# Patient Record
Sex: FEMALE | Race: ASIAN | NOT HISPANIC OR LATINO | URBAN - METROPOLITAN AREA
[De-identification: names, ages, dates, MRNs, and addresses within clinical notes are randomized per-mention and may not be internally consistent; named-entity substitution may affect disease eponyms.]

---

## 2023-09-15 ENCOUNTER — APPOINTMENT (RX ONLY)
Dept: URBAN - METROPOLITAN AREA CLINIC 164 | Facility: CLINIC | Age: 23
Setting detail: DERMATOLOGY
End: 2023-09-15

## 2023-09-15 DIAGNOSIS — L82.1 OTHER SEBORRHEIC KERATOSIS: ICD-10-CM

## 2023-09-15 DIAGNOSIS — L81.4 OTHER MELANIN HYPERPIGMENTATION: ICD-10-CM | Status: INADEQUATELY CONTROLLED

## 2023-09-15 PROCEDURE — 99204 OFFICE O/P NEW MOD 45 MIN: CPT

## 2023-09-15 PROCEDURE — ? BENIGN DESTRUCTION COSMETIC

## 2023-09-15 PROCEDURE — ? COUNSELING

## 2023-09-15 PROCEDURE — ? PRESCRIPTION

## 2023-09-15 RX ORDER — HYDROQUINONE 4 %
CREAM (GRAM) TOPICAL
Qty: 30 | Refills: 2 | Status: ACTIVE

## 2023-09-15 ASSESSMENT — LOCATION DETAILED DESCRIPTION DERM
LOCATION DETAILED: LEFT SUPERIOR LATERAL MALAR CHEEK
LOCATION DETAILED: LEFT SUPERIOR CENTRAL MALAR CHEEK
LOCATION DETAILED: RIGHT SUPERIOR CENTRAL MALAR CHEEK
LOCATION DETAILED: LEFT INFERIOR CENTRAL MALAR CHEEK

## 2023-09-15 ASSESSMENT — LOCATION SIMPLE DESCRIPTION DERM
LOCATION SIMPLE: LEFT CHEEK
LOCATION SIMPLE: RIGHT CHEEK

## 2023-09-15 ASSESSMENT — LOCATION ZONE DERM: LOCATION ZONE: FACE

## 2023-09-15 NOTE — PROCEDURE: BENIGN DESTRUCTION COSMETIC
Post-Care Instructions: I reviewed with the patient in detail post-care instructions. Patient is to wear sunprotection, and avoid picking at any of the treated lesions. Pt may apply Vaseline to crusted or scabbing areas.
Price (Use Numbers Only, No Special Characters Or $): 125
Detail Level: Detailed
Anesthesia Type: 2% lidocaine with epinephrine
Consent: The patient's consent was obtained including but not limited to risks of crusting, scabbing, blistering, scarring, darker or lighter pigmentary change, recurrence, incomplete removal and infection.
Anesthesia Volume In Cc: 1

## 2024-01-26 ENCOUNTER — APPOINTMENT (RX ONLY)
Dept: URBAN - METROPOLITAN AREA CLINIC 164 | Facility: CLINIC | Age: 24
Setting detail: DERMATOLOGY
End: 2024-01-26

## 2024-01-26 DIAGNOSIS — L82.1 OTHER SEBORRHEIC KERATOSIS: ICD-10-CM

## 2024-01-26 DIAGNOSIS — L91.8 OTHER HYPERTROPHIC DISORDERS OF THE SKIN: ICD-10-CM

## 2024-01-26 DIAGNOSIS — Z41.9 ENCOUNTER FOR PROCEDURE FOR PURPOSES OTHER THAN REMEDYING HEALTH STATE, UNSPECIFIED: ICD-10-CM

## 2024-01-26 PROCEDURE — ? ADDITIONAL NOTES

## 2024-01-26 PROCEDURE — ? COSMETIC CONSULTATION: CHEMICAL PEELS

## 2024-01-26 PROCEDURE — ? COSMETIC CONSULTATION: HYDRAFACIAL

## 2024-01-26 PROCEDURE — ? COSMETIC QUOTE

## 2024-01-26 PROCEDURE — ? PRODUCT LINE (ZO SKIN HEALTH)

## 2024-01-26 PROCEDURE — ? COUNSELING

## 2024-01-26 PROCEDURE — ? BENIGN DESTRUCTION COSMETIC

## 2024-01-26 ASSESSMENT — LOCATION DETAILED DESCRIPTION DERM
LOCATION DETAILED: RIGHT AXILLARY VAULT
LOCATION DETAILED: LEFT AXILLARY VAULT
LOCATION DETAILED: RIGHT SUPERIOR MEDIAL MALAR CHEEK
LOCATION DETAILED: RIGHT SUPERIOR CENTRAL MALAR CHEEK

## 2024-01-26 ASSESSMENT — LOCATION SIMPLE DESCRIPTION DERM
LOCATION SIMPLE: LEFT AXILLARY VAULT
LOCATION SIMPLE: RIGHT CHEEK
LOCATION SIMPLE: RIGHT AXILLARY VAULT

## 2024-01-26 ASSESSMENT — LOCATION ZONE DERM
LOCATION ZONE: AXILLAE
LOCATION ZONE: FACE

## 2024-01-26 NOTE — PROCEDURE: COSMETIC QUOTE
Face Procedure 10 Units: 1
Breast Procedure 3 Percentage Discount: 0
Laser 14 Free Text Discount (In Dollars- Use Only Numbers And Decimals): 0.00
Face Procedure 4: VI Peel (Advanced)
Body Procedure 4: SkinPen
Face Procedure 1: Dermaplane add on
Body Procedure 1: VI Peel Body (small area)
Face Procedure 8 Price/Unit (In Dollars- Use Only Numbers And Decimals): 199
Misc Procedure 1 Price/Unit (In Dollars- Use Only Numbers And Decimals): 499
Face Procedure 5 Price/Unit (In Dollars- Use Only Numbers And Decimals): 389
Include Sales Tax On Facility Fees: No
Face Procedure 5 Percentage Discount: 15
Face Procedure 2 Units: 3
Body Procedure 3 Price/Unit (In Dollars- Use Only Numbers And Decimals): 299
Face Procedure 3 Price/Unit (In Dollars- Use Only Numbers And Decimals): 265
Face Procedure 10: HydraFacial (Platinum)
Face Procedure 7: Cosmetic Extractions (Milia)
Face Procedure 7 Price/Unit (In Dollars- Use Only Numbers And Decimals): 75
Body Procedure 4 Price/Unit (In Dollars- Use Only Numbers And Decimals): 329
Face Procedure 4 Price/Unit (In Dollars- Use Only Numbers And Decimals): 369
Face Procedure 1 Price/Unit (In Dollars- Use Only Numbers And Decimals): 50
Face Procedure 8: HydraFacial (Signature)
Include Sales Tax On Surgeon's Fees: Yes
Misc Procedure 1: HydraFacial Keravive
Face Procedure 5: Vi Peel Purify w/ Precision Plus
Body Procedure 2: VI Peel Body (large area)
Face Procedure 2 Price/Unit (In Dollars- Use Only Numbers And Decimals): 293.33
Body Procedure 2 Price/Unit (In Dollars- Use Only Numbers And Decimals): 450
Face Procedure 9: VI Peel Purify
Face Procedure 6: VI Peel Precision Plus
Face Procedure 3: HydraFacial (Deluxe)
Body Procedure 3: HydraFacial (Back)
Detail Level: Zone

## 2024-01-26 NOTE — PROCEDURE: PRODUCT LINE ([facility])
Product 41 Units: 0
Product 1 Price (In Dollars - Numeric Only, No Special Characters Or $): 47
Product 44 Price (In Dollars - Numeric Only, No Special Characters Or $): 0.00
Name Of Product 4: Renewal Creme
Product 11 Price (In Dollars - Numeric Only, No Special Characters Or $): 64
Product 16 Price (In Dollars - Numeric Only, No Special Characters Or $): 193
Product 24 Price (In Dollars - Numeric Only, No Special Characters Or $): 145
Product 13 Application Directions: Apply AM & PM before moisturizer.
Product 21 Application Directions: Apply 2x a week at night for week 0-2.\\nApply 3x a week at night for week 2-4.\\nApply 4x a week at night for week 4-6.
Product 29 Application Directions: Cleanse am & pm daily
Name Of Product 27: Exfoliation Accelerator
Name Of Product 19: Exfoliating Polish (travel)
Detail Level: Zone
Product 8 Application Directions: Apply AM & PM 7x a week.
Product 4 Price (In Dollars - Numeric Only, No Special Characters Or $): 115
Name Of Product 30: BrightAlive
Name Of Product 14: Dual Action Scrub
Name Of Product 9: Daily Power Defense
Name Of Product 22: Retinol Skin Brightener 0.1 Retinol
Product 27 Price (In Dollars - Numeric Only, No Special Characters Or $): 72
Product 19 Price (In Dollars - Numeric Only, No Special Characters Or $): 21
Product 1 Application Directions: Cleanse AM & PM 7x a week.
Product 24 Application Directions: Apply AM & PM after cleansing & toner.
Product 16 Application Directions: Written directions were handed to patient.
Name Of Product 2: Exfoliating Polish
Assigning Risk Information: Per AMA, level of risk is based upon consequences of the problem(s) addressed at the encounter when appropriately treated. Risk also includes medical decision making related to the need to initiate or forego further testing, treatment and/or hospitalization. Over the counter medication are assigned a risk level of low. Prescription medication management is assigned a risk level of moderate.
Product 30 Price (In Dollars - Numeric Only, No Special Characters Or $): 130
Name Of Product 12: Acne Control
Product 9 Price (In Dollars - Numeric Only, No Special Characters Or $): 160
Risk Of Complication Category: No MDM
Product 6 Application Directions: Use AM & PM 7x a week
Product 14 Price (In Dollars - Numeric Only, No Special Characters Or $): 80
Product 27 Application Directions: Apply AM, 7x after cleansing.
Product 19 Application Directions: Exfoliate 2-3x a week.
Name Of Product 17: Sunscreen + Primer SPF 30
Name Of Product 25: Brightening Eye Creme
Product 2 Price (In Dollars - Numeric Only, No Special Characters Or $): 68
Name Of Product 7: Recovery Creme
Name Of Product 5: Complexion Renewal Pads
Product 12 Price (In Dollars - Numeric Only, No Special Characters Or $): 36
Name Of Product 20: Instant Pore Refiner
Product 17 Price (In Dollars - Numeric Only, No Special Characters Or $): 67
Allow Plan To Count Towards E/M Coding: Yes
Product 14 Application Directions: Scrub in the PM 3-5x a week.
Product 22 Application Directions: Written instructions were handed to the patient.
Product 30 Application Directions: Apply AM & PM after Daily Power Defense
Name Of Product 28: Illuminating AOX Serum
Product 7 Price (In Dollars - Numeric Only, No Special Characters Or $): 125
Product 5 Price (In Dollars - Numeric Only, No Special Characters Or $): 53
Name Of Product 10: Exfoliating Cleanser
Name Of Product 23: Grown Factor Eye Serum
Product 28 Price (In Dollars - Numeric Only, No Special Characters Or $): 165
Product 20 Price (In Dollars - Numeric Only, No Special Characters Or $): 62
Product 2 Application Directions: Use AM or PM 2-3x a week.
Product 12 Application Directions: Cleanse & Apply to cystic flare up 1-3x a day.
Product 17 Application Directions: Apply AM & reapply if prolong in the sun, every 2 hours.
Name Of Product 15: Wrinkle + Texture Repair
Product 25 Application Directions: Apply AM & PM on the lower lids only, after cleansing.
Name Of Product 3: Calming Toner
Product 5 Application Directions: Apply after cleansing in the AM & PM 7x a week.
Product 10 Price (In Dollars - Numeric Only, No Special Characters Or $): 49
Name Of Product 13: Rozatrol
Product 15 Price (In Dollars - Numeric Only, No Special Characters Or $): 159
Product 28 Application Directions: Apply AM before SPF.
Product 7 Application Directions: Apply in the PM, include the neck. Can be applied in the AM if dry.
Name Of Product 18: ZO Skin Brightener 0.5 Retinol
Name Of Product 26: Hydrating Creme
Product 3 Price (In Dollars - Numeric Only, No Special Characters Or $): 40
Product 10 Units: 1
Name Of Product 6: Hydrating Cleanser
Name Of Product 21: Retinol Skin Brightener 0.25
Product 26 Price (In Dollars - Numeric Only, No Special Characters Or $): 120
Name Of Product 8: Growth Factor Serum
Product 23 Application Directions: Apply to upper & lower lids after cleansing.
Product 15 Application Directions: Apply 2x a week at night (0-2 weeks)\\nApply 3x a week at night (2-4 weeks)\\nApply 4x a week at night (4-6 weeks)
Name Of Product 29: Balancing Emulsion Cleanser
Product 13 Price (In Dollars - Numeric Only, No Special Characters Or $): 91
Product 21 Price (In Dollars - Numeric Only, No Special Characters Or $): 100
Product 29 Price (In Dollars - Numeric Only, No Special Characters Or $): 52
Product 3 Application Directions: Apply AM & PM 7x a week after cleansing or exfoliating (on days you exfoliate).
Name Of Product 11: Oil Control Pads
Product 8 Price (In Dollars - Numeric Only, No Special Characters Or $): 168
Name Of Product 1: Gentle Cleanser
Name Of Product 24: ZO BrightAlive Skin Brightener
Name Of Product 16: Skin Brightening Program
Product 26 Application Directions: Apply after cleaning and toning

## 2024-01-26 NOTE — PROCEDURE: COSMETIC CONSULTATION: CHEMICAL PEELS
Send Procedure Quote As Charge: No
Procedure Quote $ (Will Render In Note. Use Numbers Only, No Text Please.): 389
Detail Level: Detailed

## 2024-01-26 NOTE — PROCEDURE: ADDITIONAL NOTES
Detail Level: Zone
Additional Notes: Patient came in for a cosmetic consult. Patients concerns are acne, brown spots (face & back) & is curious about face slimming options. I recommended monthly HydraFacials (Platinum) we also discussed facial & body VI Peels. SkinPen was also consulted on. \\n\\nPatient was pleased with the consultation & will call back to schedule any future treatments.
Render Risk Assessment In Note?: no

## 2024-01-26 NOTE — PROCEDURE: BENIGN DESTRUCTION COSMETIC
Anesthesia Volume In Cc: 0
Post-Care Instructions: I reviewed with the patient in detail post-care instructions. Patient is to wear sunprotection, and avoid picking at any of the treated lesions. Pt may apply Vaseline to crusted or scabbing areas.
Detail Level: Detailed
Consent: The patient's consent was obtained including but not limited to risks of crusting, scabbing, blistering, scarring, darker or lighter pigmentary change, recurrence, incomplete removal and infection.
Price (Use Numbers Only, No Special Characters Or $): 125